# Patient Record
Sex: FEMALE | Race: WHITE | NOT HISPANIC OR LATINO | Employment: FULL TIME | URBAN - METROPOLITAN AREA
[De-identification: names, ages, dates, MRNs, and addresses within clinical notes are randomized per-mention and may not be internally consistent; named-entity substitution may affect disease eponyms.]

---

## 2022-07-31 ENCOUNTER — HOSPITAL ENCOUNTER (EMERGENCY)
Facility: HOSPITAL | Age: 57
Discharge: HOME | End: 2022-07-31
Attending: EMERGENCY MEDICINE | Admitting: EMERGENCY MEDICINE

## 2022-07-31 VITALS
HEIGHT: 64 IN | WEIGHT: 202 LBS | DIASTOLIC BLOOD PRESSURE: 82 MMHG | SYSTOLIC BLOOD PRESSURE: 130 MMHG | RESPIRATION RATE: 16 BRPM | TEMPERATURE: 96.6 F | OXYGEN SATURATION: 96 % | BODY MASS INDEX: 34.49 KG/M2 | HEART RATE: 72 BPM

## 2022-07-31 DIAGNOSIS — E11.65 UNCONTROLLED TYPE 2 DIABETES MELLITUS WITH HYPERGLYCEMIA: Primary | ICD-10-CM

## 2022-07-31 LAB
ALBUMIN SERPL-MCNC: 4.2 G/DL (ref 3.5–5.2)
ALBUMIN/GLOB SERPL: 1.3 G/DL
ALP SERPL-CCNC: 72 U/L (ref 39–117)
ALT SERPL W P-5'-P-CCNC: 89 U/L (ref 1–33)
ANION GAP SERPL CALCULATED.3IONS-SCNC: 14 MMOL/L (ref 5–15)
AST SERPL-CCNC: 50 U/L (ref 1–32)
B-OH-BUTYR SERPL-SCNC: 0.17 MMOL/L (ref 0.02–0.27)
BASOPHILS # BLD AUTO: 0.02 10*3/MM3 (ref 0–0.2)
BASOPHILS NFR BLD AUTO: 0.4 % (ref 0–1.5)
BILIRUB SERPL-MCNC: 0.4 MG/DL (ref 0–1.2)
BILIRUB UR QL STRIP: NEGATIVE
BUN SERPL-MCNC: 7 MG/DL (ref 6–20)
BUN/CREAT SERPL: 7.5 (ref 7–25)
CALCIUM SPEC-SCNC: 9.3 MG/DL (ref 8.6–10.5)
CHLORIDE SERPL-SCNC: 97 MMOL/L (ref 98–107)
CLARITY UR: CLEAR
CO2 SERPL-SCNC: 24 MMOL/L (ref 22–29)
COLOR UR: YELLOW
CREAT SERPL-MCNC: 0.93 MG/DL (ref 0.57–1)
DEPRECATED RDW RBC AUTO: 41.5 FL (ref 37–54)
EGFRCR SERPLBLD CKD-EPI 2021: 71.8 ML/MIN/1.73
EOSINOPHIL # BLD AUTO: 0.11 10*3/MM3 (ref 0–0.4)
EOSINOPHIL NFR BLD AUTO: 2.2 % (ref 0.3–6.2)
ERYTHROCYTE [DISTWIDTH] IN BLOOD BY AUTOMATED COUNT: 12.6 % (ref 12.3–15.4)
GLOBULIN UR ELPH-MCNC: 3.2 GM/DL
GLUCOSE BLDC GLUCOMTR-MCNC: 314 MG/DL (ref 70–130)
GLUCOSE BLDC GLUCOMTR-MCNC: 359 MG/DL (ref 70–130)
GLUCOSE BLDC GLUCOMTR-MCNC: >599 MG/DL (ref 70–130)
GLUCOSE SERPL-MCNC: 585 MG/DL (ref 65–99)
GLUCOSE UR STRIP-MCNC: ABNORMAL MG/DL
HCT VFR BLD AUTO: 42.3 % (ref 34–46.6)
HGB BLD-MCNC: 14.1 G/DL (ref 12–15.9)
HGB UR QL STRIP.AUTO: NEGATIVE
HOLD SPECIMEN: NORMAL
HOLD SPECIMEN: NORMAL
IMM GRANULOCYTES # BLD AUTO: 0.02 10*3/MM3 (ref 0–0.05)
IMM GRANULOCYTES NFR BLD AUTO: 0.4 % (ref 0–0.5)
KETONES UR QL STRIP: NEGATIVE
LEUKOCYTE ESTERASE UR QL STRIP.AUTO: NEGATIVE
LYMPHOCYTES # BLD AUTO: 1.26 10*3/MM3 (ref 0.7–3.1)
LYMPHOCYTES NFR BLD AUTO: 25.7 % (ref 19.6–45.3)
MCH RBC QN AUTO: 30.3 PG (ref 26.6–33)
MCHC RBC AUTO-ENTMCNC: 33.3 G/DL (ref 31.5–35.7)
MCV RBC AUTO: 90.8 FL (ref 79–97)
MONOCYTES # BLD AUTO: 0.4 10*3/MM3 (ref 0.1–0.9)
MONOCYTES NFR BLD AUTO: 8.1 % (ref 5–12)
NEUTROPHILS NFR BLD AUTO: 3.1 10*3/MM3 (ref 1.7–7)
NEUTROPHILS NFR BLD AUTO: 63.2 % (ref 42.7–76)
NITRITE UR QL STRIP: NEGATIVE
NRBC BLD AUTO-RTO: 0 /100 WBC (ref 0–0.2)
PH UR STRIP.AUTO: 5.5 [PH] (ref 5–8)
PLATELET # BLD AUTO: 219 10*3/MM3 (ref 140–450)
PMV BLD AUTO: 10.5 FL (ref 6–12)
POTASSIUM SERPL-SCNC: 3.9 MMOL/L (ref 3.5–5.2)
PROT SERPL-MCNC: 7.4 G/DL (ref 6–8.5)
PROT UR QL STRIP: NEGATIVE
RBC # BLD AUTO: 4.66 10*6/MM3 (ref 3.77–5.28)
SODIUM SERPL-SCNC: 135 MMOL/L (ref 136–145)
SP GR UR STRIP: >=1.03 (ref 1–1.03)
UROBILINOGEN UR QL STRIP: ABNORMAL
WBC NRBC COR # BLD: 4.91 10*3/MM3 (ref 3.4–10.8)
WHOLE BLOOD HOLD COAG: NORMAL
WHOLE BLOOD HOLD SPECIMEN: NORMAL

## 2022-07-31 PROCEDURE — 85025 COMPLETE CBC W/AUTO DIFF WBC: CPT

## 2022-07-31 PROCEDURE — 63710000001 INSULIN REGULAR HUMAN PER 5 UNITS: Performed by: NURSE PRACTITIONER

## 2022-07-31 PROCEDURE — 82962 GLUCOSE BLOOD TEST: CPT

## 2022-07-31 PROCEDURE — 99284 EMERGENCY DEPT VISIT MOD MDM: CPT

## 2022-07-31 PROCEDURE — 96374 THER/PROPH/DIAG INJ IV PUSH: CPT

## 2022-07-31 PROCEDURE — 82010 KETONE BODYS QUAN: CPT | Performed by: EMERGENCY MEDICINE

## 2022-07-31 PROCEDURE — 81003 URINALYSIS AUTO W/O SCOPE: CPT

## 2022-07-31 PROCEDURE — 80053 COMPREHEN METABOLIC PANEL: CPT

## 2022-07-31 RX ORDER — DULAGLUTIDE 1.5 MG/.5ML
0.5 INJECTION, SOLUTION SUBCUTANEOUS WEEKLY
Qty: 2 ML | Refills: 0 | Status: SHIPPED | OUTPATIENT
Start: 2022-07-31 | End: 2022-08-25 | Stop reason: SDUPTHER

## 2022-07-31 RX ORDER — INSULIN LISPRO 100 [IU]/ML
INJECTION, SUSPENSION SUBCUTANEOUS
Qty: 3 PEN | Refills: 0 | Status: SHIPPED | OUTPATIENT
Start: 2022-07-31 | End: 2022-08-19

## 2022-07-31 RX ORDER — SODIUM CHLORIDE 0.9 % (FLUSH) 0.9 %
10 SYRINGE (ML) INJECTION AS NEEDED
Status: DISCONTINUED | OUTPATIENT
Start: 2022-07-31 | End: 2022-07-31 | Stop reason: HOSPADM

## 2022-07-31 RX ADMIN — SODIUM CHLORIDE 1000 ML: 9 INJECTION, SOLUTION INTRAVENOUS at 14:35

## 2022-07-31 RX ADMIN — INSULIN HUMAN 10 UNITS: 100 INJECTION, SOLUTION PARENTERAL at 14:37

## 2022-08-01 ENCOUNTER — TELEPHONE (OUTPATIENT)
Dept: URGENT CARE | Facility: CLINIC | Age: 57
End: 2022-08-01

## 2022-08-01 NOTE — TELEPHONE ENCOUNTER
PATIENT HERE REQUESTING WORK NOTE. PATIENT WAS SEEN HERE YESTERDAY BY SAMARIA AND ADVISED TO GO TO HOSPITAL ER DUE TO HYPERGLYCEMIA. PATIENT NOTE STATES PATIENT WAS SEEN HERE AND RECOMMENDED TO GO TO ER FOR FURTHER EVALUATION. PATIENT NOTE DID  NOT SAY SHE WAS RELEASED FOR WORK, PATIENT INSTRUCTED SHE WOULD HAVE TO GET RETURN TO WORK NOTE FROM ER.

## 2022-08-04 ENCOUNTER — OFFICE VISIT (OUTPATIENT)
Dept: INTERNAL MEDICINE | Facility: CLINIC | Age: 57
End: 2022-08-04

## 2022-08-04 VITALS
TEMPERATURE: 97.1 F | OXYGEN SATURATION: 97 % | BODY MASS INDEX: 34.69 KG/M2 | SYSTOLIC BLOOD PRESSURE: 142 MMHG | DIASTOLIC BLOOD PRESSURE: 98 MMHG | HEART RATE: 80 BPM | WEIGHT: 203.2 LBS | HEIGHT: 64 IN

## 2022-08-04 DIAGNOSIS — E11.65 TYPE 2 DIABETES MELLITUS WITH HYPERGLYCEMIA, WITH LONG-TERM CURRENT USE OF INSULIN: ICD-10-CM

## 2022-08-04 DIAGNOSIS — K21.9 GASTROESOPHAGEAL REFLUX DISEASE, UNSPECIFIED WHETHER ESOPHAGITIS PRESENT: ICD-10-CM

## 2022-08-04 DIAGNOSIS — R53.82 CHRONIC FATIGUE: ICD-10-CM

## 2022-08-04 DIAGNOSIS — R03.0 ELEVATED BLOOD PRESSURE READING: ICD-10-CM
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
## 2022-08-04 DIAGNOSIS — Z79.4 TYPE 2 DIABETES MELLITUS WITH HYPERGLYCEMIA, WITH LONG-TERM CURRENT USE OF INSULIN: ICD-10-CM

## 2022-08-04 DIAGNOSIS — E03.9 HYPOTHYROIDISM, UNSPECIFIED TYPE: ICD-10-CM

## 2022-08-04 DIAGNOSIS — F41.8 MIXED ANXIETY AND DEPRESSIVE DISORDER: Primary | ICD-10-CM

## 2022-08-04 DIAGNOSIS — Z00.00 HEALTH CARE MAINTENANCE: ICD-10-CM

## 2022-08-04 PROCEDURE — 99214 OFFICE O/P EST MOD 30 MIN: CPT | Performed by: PHYSICIAN ASSISTANT

## 2022-08-04 RX ORDER — LEVOTHYROXINE SODIUM 0.2 MG/1
200 TABLET ORAL
Qty: 90 TABLET | Refills: 1 | Status: SHIPPED | OUTPATIENT
Start: 2022-08-04 | End: 2023-02-03

## 2022-08-04 RX ORDER — LEVOTHYROXINE SODIUM 0.05 MG/1
50 TABLET ORAL
Qty: 90 TABLET | Refills: 1 | Status: SHIPPED | OUTPATIENT
Start: 2022-08-04

## 2022-08-04 RX ORDER — BUPROPION HYDROCHLORIDE 300 MG/1
300 TABLET ORAL EVERY MORNING
Qty: 90 TABLET | Refills: 1 | Status: SHIPPED | OUTPATIENT
Start: 2022-08-04 | End: 2023-02-03

## 2022-08-04 RX ORDER — VENLAFAXINE HYDROCHLORIDE 75 MG/1
75 CAPSULE, EXTENDED RELEASE ORAL DAILY
Qty: 90 CAPSULE | Refills: 1 | Status: SHIPPED | OUTPATIENT
Start: 2022-08-04 | End: 2022-08-25

## 2022-08-04 RX ORDER — EPINEPHRINE 0.3 MG/.3ML
0.3 INJECTION SUBCUTANEOUS ONCE
Qty: 1 EACH | Refills: 1 | Status: SHIPPED | OUTPATIENT
Start: 2022-08-04 | End: 2022-08-04

## 2022-08-04 NOTE — PROGRESS NOTES
[facility]  [address]  [facility] [address]  Dept: [phone]  Dept Fax: [phone]  Loc: [phone]  Loc Fax: [phone]    Maureen Kohler  1965    Follow Up Office Visit Note    History of Present Illness:  Patient is a 57-year-old female in today to establish care for hypothyroidism, type 2 diabetes, chronic fatigue, GERD, mixed anxiety and depression, and history of hypertension.  Patient needing referral back to endocrinology.  On Synthroid 250 mcg daily.  Needing refill.    Patient recently in the emergency room for really high blood sugar reading.  Patient blood sugars down to about 400 now.  Was off of Trulicity for a while and got back on it recently.  Needing A1c rechecked.  Needing referral to endocrinology for this.  Denies any symptoms.  Overall feeling well with regard to this.    Continues to have chronic fatigue would like further evaluation for this.    On omeprazole 20 mg daily.  Taking as directed without problems or side effects.  Reports good symptomatic control.    On Wellbutrin 300 mg extended release daily and Effexor 75 mg extended release daily.  Taking as directed without any problems or side effects.  Reports good symptomatic control of anxiety and depression.    Has history of high blood pressure but not been on any medicine for a while.  Patient reports being normotensive.  When asked about blood pressure being high today patient states that she is nervous about being in the doctor's office.  Does not take her blood pressure at home.    Patient recently fell about a month ago.  Hit her head but did not seek immediate medical attention.  Head still hurting.  Patient struck the back of her head when she was getting out of the shower and slipped.      The following portions of the patient's history were reviewed and updated as appropriate: allergies, current medications, past family history, past medical history,  past social history, past surgical history, and problem list.    Medications:    Current Outpatient Medications:   •  buPROPion XL (WELLBUTRIN XL) 300 MG 24 hr tablet, Take 1 tablet by mouth Every Morning., Disp: 90 tablet, Rfl: 1  •  Calcium Carbonate 1500 (600 Ca) MG tablet, calcium carbonate 600 mg calcium (1,500 mg) tablet  Take 1 tablet every day by oral route., Disp: , Rfl:   •  Cholecalciferol 25 MCG (1000 UT) capsule, Vitamin D3 25 mcg (1,000 unit) capsule  Take 1 capsule every day by oral route., Disp: , Rfl:   •  desoximetasone (TOPICORT) 0.25 % cream, desoximetasone 0.25 % topical cream  APPLY TO RASH ON BACK BID X 2 WEEKS PRN FLARES, Disp: , Rfl:   •  Dulaglutide (Trulicity) 1.5 MG/0.5ML solution pen-injector, Inject 1.5 mg under the skin into the appropriate area as directed 1 (One) Time Per Week for 4 doses., Disp: 2 mL, Rfl: 0  •  EPINEPHrine (EPIPEN) 0.3 MG/0.3ML solution auto-injector injection, Inject 0.3 mL into the appropriate muscle as directed by prescriber 1 (One) Time for 1 dose., Disp: 1 each, Rfl: 1  •  Glucosamine Sulfate 500 MG tablet, glucosamine sulfate 500 mg tablet  Take 1 tablet every day by oral route., Disp: , Rfl:   •  Insulin Lispro Prot & Lispro (HumaLOG Mix 75/25 KwikPen) (75-25) 100 UNIT/ML suspension pen-injector pen, 10 units before breakfast 14 units before lunch 44 units at dinner, Disp: 3 pen, Rfl: 0  •  levothyroxine (SYNTHROID, LEVOTHROID) 200 MCG tablet, Take 1 tablet by mouth Every Morning., Disp: 90 tablet, Rfl: 1  •  levothyroxine (SYNTHROID, LEVOTHROID) 50 MCG tablet, Take 1 tablet by mouth Every Morning., Disp: 90 tablet, Rfl: 1  •  metFORMIN (GLUCOPHAGE) 1000 MG tablet, Take 1 tablet by mouth Every 12 (Twelve) Hours for 30 days., Disp: 60 tablet, Rfl: 0  •  omeprazole OTC (PriLOSEC OTC) 20 MG EC tablet, omeprazole 20 mg tablet,delayed release  Take 1 tablet every day by oral route., Disp: , Rfl:   •  polycarbophil 625 MG tablet tablet, Fiber (calcium  polycarbophil) 625 mg tablet  Take 1 tablet every day by oral route., Disp: , Rfl:   •  venlafaxine XR (EFFEXOR-XR) 75 MG 24 hr capsule, Take 1 capsule by mouth Daily., Disp: 90 capsule, Rfl: 1    Subjective  Allergies   Allergen Reactions   • Ace Inhibitors Other (See Comments) and Swelling   • Simvastatin Other (See Comments)        Past Medical History:   Diagnosis Date   • Anxiety    • Arthritis    • Cholelithiasis     taken out 23 years ago   • Depression     age 16   • Diabetes mellitus (HCC)    • Disease of thyroid gland    • GERD (gastroesophageal reflux disease)    • Headache     age 12   • Hyperlipidemia    • Hypertension    • Hypothyroidism     23 years ago   • Kidney stone     1 stone   • Low back pain     age 12   • Obesity    • Urinary tract infection        Past Surgical History:   Procedure Laterality Date   • CHOLECYSTECTOMY     • COLONOSCOPY     • HYMENECTOMY         Family History   Problem Relation Age of Onset   • Miscarriages / Stillbirths Mother         miscarriages   • Vision loss Mother    • Cancer Father            • COPD Father    • Hyperlipidemia Father    • Kidney disease Father    • Stroke Father    • Thyroid disease Father         Social History     Socioeconomic History   • Marital status:    Tobacco Use   • Smoking status: Never Smoker   • Smokeless tobacco: Never Used   Vaping Use   • Vaping Use: Never used   Substance and Sexual Activity   • Alcohol use: Never   • Drug use: Defer   • Sexual activity: Yes     Partners: Male     Birth control/protection: I.U.D.       Review of Systems   Constitutional: Positive for fatigue. Negative for activity change, chills, fever and unexpected weight change.   HENT: Negative for congestion, ear pain, postnasal drip, sinus pressure and sore throat.    Eyes: Negative for pain, discharge and redness.   Respiratory: Negative for cough, shortness of breath and wheezing.    Cardiovascular: Negative for chest pain, palpitations and leg  "swelling.   Gastrointestinal: Negative for diarrhea, nausea and vomiting.   Endocrine: Negative for cold intolerance and heat intolerance.   Genitourinary: Negative for decreased urine volume and dysuria.   Musculoskeletal: Negative for arthralgias and myalgias.   Skin: Negative for rash and wound.   Neurological: Positive for headaches. Negative for dizziness and light-headedness.   Hematological: Does not bruise/bleed easily.   Psychiatric/Behavioral: Negative for confusion, dysphoric mood and sleep disturbance. The patient is not nervous/anxious.          Objective  Vitals:    08/04/22 1516   BP: 142/98   BP Location: Left arm   Patient Position: Sitting   Pulse: 80   Temp: 97.1 °F (36.2 °C)   TempSrc: Temporal   SpO2: 97%   Weight: 92.2 kg (203 lb 3.2 oz)   Height: 162.6 cm (64.02\")   PainSc:   6     Body mass index is 34.86 kg/m².     Physical Exam  Physical Exam  Vitals and nursing note reviewed.   Constitutional:       General: She is not in acute distress.     Appearance: She is not ill-appearing.   HENT:      Head: Normocephalic.      Right Ear: Tympanic membrane, ear canal and external ear normal. There is no impacted cerumen.      Left Ear: Tympanic membrane, ear canal and external ear normal. There is no impacted cerumen.      Nose: No congestion or rhinorrhea.      Mouth/Throat:      Mouth: Mucous membranes are moist.      Pharynx: Oropharynx is clear. No oropharyngeal exudate or posterior oropharyngeal erythema.   Eyes:      General:         Right eye: No discharge.         Left eye: No discharge.      Extraocular Movements: Extraocular movements intact.      Conjunctiva/sclera: Conjunctivae normal.      Pupils: Pupils are equal, round, and reactive to light.   Cardiovascular:      Rate and Rhythm: Normal rate and regular rhythm.      Heart sounds: Normal heart sounds. No murmur heard.    No friction rub. No gallop.   Pulmonary:      Effort: Pulmonary effort is normal. No respiratory distress.      " Breath sounds: Normal breath sounds. No wheezing.   Abdominal:      General: Bowel sounds are normal. There is no distension.      Palpations: Abdomen is soft. There is no mass.      Tenderness: There is no abdominal tenderness.   Musculoskeletal:         General: No swelling. Normal range of motion.      Cervical back: Normal range of motion. No tenderness.      Right lower leg: No edema.      Left lower leg: No edema.   Lymphadenopathy:      Cervical: No cervical adenopathy.   Skin:     Findings: No bruising, erythema or rash.   Neurological:      Mental Status: She is oriented to person, place, and time.      Gait: Gait normal.   Psychiatric:         Mood and Affect: Mood normal.         Behavior: Behavior normal.         Thought Content: Thought content normal.         Judgment: Judgment normal.         Diagnostic Data  Procedures    Assessment  Diagnoses and all orders for this visit:    1. Mixed anxiety and depressive disorder (Primary)    2. Fall, subsequent encounter  -     CT Head Without Contrast; Future    3. Type 2 diabetes mellitus with hyperglycemia, with long-term current use of insulin (HCC)  -     Hemoglobin A1c; Future  -     Ambulatory Referral to Endocrinology  -     Microalbumin / Creatinine Urine Ratio - Urine, Clean Catch; Future    4. Hypothyroidism, unspecified type    5. Gastroesophageal reflux disease, unspecified whether esophagitis present    6. Elevated blood pressure reading    7. Chronic fatigue  -     Vitamin B12; Future  -     Folate; Future    8. Health care maintenance  -     CBC w AUTO Differential; Future  -     Comprehensive Metabolic Panel  -     Hemoglobin A1c; Future  -     Lipid Panel  -     TSH; Future  -     Hepatitis C Antibody    Other orders  -     buPROPion XL (WELLBUTRIN XL) 300 MG 24 hr tablet; Take 1 tablet by mouth Every Morning.  Dispense: 90 tablet; Refill: 1  -     EPINEPHrine (EPIPEN) 0.3 MG/0.3ML solution auto-injector injection; Inject 0.3 mL into the  appropriate muscle as directed by prescriber 1 (One) Time for 1 dose.  Dispense: 1 each; Refill: 1  -     levothyroxine (SYNTHROID, LEVOTHROID) 200 MCG tablet; Take 1 tablet by mouth Every Morning.  Dispense: 90 tablet; Refill: 1  -     levothyroxine (SYNTHROID, LEVOTHROID) 50 MCG tablet; Take 1 tablet by mouth Every Morning.  Dispense: 90 tablet; Refill: 1  -     venlafaxine XR (EFFEXOR-XR) 75 MG 24 hr capsule; Take 1 capsule by mouth Daily.  Dispense: 90 capsule; Refill: 1        Plan    1. Mixed anxiety and depressive disorder (Primary)- well-controlled on Wellbutrin and venlafaxine.  Keep same.  Refilled meds.  Continue to monitor.    2. Fall, subsequent encounter- still having headaches.  Obtain CT head.    3. Type 2 diabetes mellitus with hyperglycemia, with long-term current use of insulin (HCC)- vastly uncontrolled on metformin and Trulicity.  Repeat A1c.  Referred to endocrinology.    4. Hypothyroidism, unspecified type- on 250 mcg of Synthroid daily.  Repeat TSH.  Referred to endocrinology.    5. Gastroesophageal reflux disease, unspecified whether esophagitis present- well controlled on omeprazole 20 mg daily.  Keep same.  Continue to monitor.    6. Elevated blood pressure reading- Advised patient to take blood pressure readings at home 2-3 times daily.  Report readings back to clinic in 2 weeks.  Advised if blood pressure higher than 160/100 or lower than 100/60 to call the office immediately. If symptomatic, go to the ER. Patient verbalized understanding of all instructions given and complied.     7. Chronic fatigue- worse, obtain vitamin B12 and folate levels.    8. Health care maintenance- obtain fasting labs.      Return in about 2 weeks (around 8/18/2022) for Recheck BP.    Isaias Bourne PA-C  08/04/2022

## 2022-08-12 ENCOUNTER — HOSPITAL ENCOUNTER (OUTPATIENT)
Dept: CT IMAGING | Facility: HOSPITAL | Age: 57
Discharge: HOME | End: 2022-08-12
Admitting: PHYSICIAN ASSISTANT
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
## 2022-08-12 PROCEDURE — 70450 CT HEAD/BRAIN W/O DYE: CPT

## 2022-08-15 ENCOUNTER — TELEPHONE (OUTPATIENT)
Dept: INTERNAL MEDICINE | Facility: CLINIC | Age: 57
End: 2022-08-15

## 2022-08-15 NOTE — TELEPHONE ENCOUNTER
Attempted to reach patient about normal ct of head no answer couldn't leave a message. Sent patient a Geniuzzt message as well.

## 2022-08-18 ENCOUNTER — LAB (OUTPATIENT)
Dept: LAB | Facility: HOSPITAL | Age: 57
End: 2022-08-18

## 2022-08-18 DIAGNOSIS — Z00.00 HEALTH CARE MAINTENANCE: ICD-10-CM

## 2022-08-18 DIAGNOSIS — Z79.4 TYPE 2 DIABETES MELLITUS WITH HYPERGLYCEMIA, WITH LONG-TERM CURRENT USE OF INSULIN: ICD-10-CM

## 2022-08-18 DIAGNOSIS — R53.82 CHRONIC FATIGUE: ICD-10-CM

## 2022-08-18 DIAGNOSIS — E11.65 TYPE 2 DIABETES MELLITUS WITH HYPERGLYCEMIA, WITH LONG-TERM CURRENT USE OF INSULIN: ICD-10-CM

## 2022-08-18 LAB
ALBUMIN SERPL-MCNC: 4.2 G/DL (ref 3.5–5.2)
ALBUMIN UR-MCNC: 2.4 MG/DL
ALBUMIN/GLOB SERPL: 1.4 G/DL
ALP SERPL-CCNC: 60 U/L (ref 39–117)
ALT SERPL W P-5'-P-CCNC: 84 U/L (ref 1–33)
ANION GAP SERPL CALCULATED.3IONS-SCNC: 11 MMOL/L (ref 5–15)
AST SERPL-CCNC: 59 U/L (ref 1–32)
BASOPHILS # BLD AUTO: 0.01 10*3/MM3 (ref 0–0.2)
BASOPHILS NFR BLD AUTO: 0.2 % (ref 0–1.5)
BILIRUB SERPL-MCNC: 0.4 MG/DL (ref 0–1.2)
BUN SERPL-MCNC: 12 MG/DL (ref 6–20)
BUN/CREAT SERPL: 22.2 (ref 7–25)
CALCIUM SPEC-SCNC: 9.3 MG/DL (ref 8.6–10.5)
CHLORIDE SERPL-SCNC: 99 MMOL/L (ref 98–107)
CHOLEST SERPL-MCNC: 191 MG/DL (ref 0–200)
CO2 SERPL-SCNC: 26 MMOL/L (ref 22–29)
CREAT SERPL-MCNC: 0.54 MG/DL (ref 0.57–1)
CREAT UR-MCNC: 101.2 MG/DL
DEPRECATED RDW RBC AUTO: 40.7 FL (ref 37–54)
EGFRCR SERPLBLD CKD-EPI 2021: 107.5 ML/MIN/1.73
EOSINOPHIL # BLD AUTO: 0.1 10*3/MM3 (ref 0–0.4)
EOSINOPHIL NFR BLD AUTO: 2.4 % (ref 0.3–6.2)
ERYTHROCYTE [DISTWIDTH] IN BLOOD BY AUTOMATED COUNT: 12.6 % (ref 12.3–15.4)
FOLATE SERPL-MCNC: 5.5 NG/ML (ref 4.78–24.2)
GLOBULIN UR ELPH-MCNC: 3.1 GM/DL
GLUCOSE SERPL-MCNC: 146 MG/DL (ref 65–99)
HBA1C MFR BLD: 12.2 % (ref 4.8–5.6)
HCT VFR BLD AUTO: 40.7 % (ref 34–46.6)
HCV AB SER DONR QL: NORMAL
HDLC SERPL-MCNC: 45 MG/DL (ref 40–60)
HGB BLD-MCNC: 13.7 G/DL (ref 12–15.9)
IMM GRANULOCYTES # BLD AUTO: 0.01 10*3/MM3 (ref 0–0.05)
IMM GRANULOCYTES NFR BLD AUTO: 0.2 % (ref 0–0.5)
LDLC SERPL CALC-MCNC: 124 MG/DL (ref 0–100)
LDLC/HDLC SERPL: 2.7 {RATIO}
LYMPHOCYTES # BLD AUTO: 1.23 10*3/MM3 (ref 0.7–3.1)
LYMPHOCYTES NFR BLD AUTO: 29.1 % (ref 19.6–45.3)
MCH RBC QN AUTO: 30 PG (ref 26.6–33)
MCHC RBC AUTO-ENTMCNC: 33.7 G/DL (ref 31.5–35.7)
MCV RBC AUTO: 89.3 FL (ref 79–97)
MICROALBUMIN/CREAT UR: 23.7 MG/G
MONOCYTES # BLD AUTO: 0.41 10*3/MM3 (ref 0.1–0.9)
MONOCYTES NFR BLD AUTO: 9.7 % (ref 5–12)
NEUTROPHILS NFR BLD AUTO: 2.47 10*3/MM3 (ref 1.7–7)
NEUTROPHILS NFR BLD AUTO: 58.4 % (ref 42.7–76)
NRBC BLD AUTO-RTO: 0 /100 WBC (ref 0–0.2)
PLATELET # BLD AUTO: 249 10*3/MM3 (ref 140–450)
PMV BLD AUTO: 10.6 FL (ref 6–12)
POTASSIUM SERPL-SCNC: 3.7 MMOL/L (ref 3.5–5.2)
PROT SERPL-MCNC: 7.3 G/DL (ref 6–8.5)
RBC # BLD AUTO: 4.56 10*6/MM3 (ref 3.77–5.28)
SODIUM SERPL-SCNC: 136 MMOL/L (ref 136–145)
TRIGL SERPL-MCNC: 122 MG/DL (ref 0–150)
TSH SERPL DL<=0.05 MIU/L-ACNC: 4.86 UIU/ML (ref 0.27–4.2)
VIT B12 BLD-MCNC: 290 PG/ML (ref 211–946)
VLDLC SERPL-MCNC: 22 MG/DL (ref 5–40)
WBC NRBC COR # BLD: 4.23 10*3/MM3 (ref 3.4–10.8)

## 2022-08-18 PROCEDURE — 86803 HEPATITIS C AB TEST: CPT | Performed by: PHYSICIAN ASSISTANT

## 2022-08-18 PROCEDURE — 36415 COLL VENOUS BLD VENIPUNCTURE: CPT | Performed by: PHYSICIAN ASSISTANT

## 2022-08-18 PROCEDURE — 82607 VITAMIN B-12: CPT

## 2022-08-18 PROCEDURE — 82746 ASSAY OF FOLIC ACID SERUM: CPT

## 2022-08-18 PROCEDURE — 82043 UR ALBUMIN QUANTITATIVE: CPT

## 2022-08-18 PROCEDURE — 83036 HEMOGLOBIN GLYCOSYLATED A1C: CPT

## 2022-08-18 PROCEDURE — 80050 GENERAL HEALTH PANEL: CPT

## 2022-08-18 PROCEDURE — 82570 ASSAY OF URINE CREATININE: CPT

## 2022-08-18 PROCEDURE — 80061 LIPID PANEL: CPT | Performed by: PHYSICIAN ASSISTANT

## 2022-08-19 RX ORDER — INSULIN LISPRO 100 [IU]/ML
INJECTION, SUSPENSION SUBCUTANEOUS
Qty: 3 PEN | Refills: 0 | Status: SHIPPED | OUTPATIENT
Start: 2022-08-19 | End: 2022-08-25 | Stop reason: SDUPTHER

## 2022-08-25 ENCOUNTER — OFFICE VISIT (OUTPATIENT)
Dept: INTERNAL MEDICINE | Facility: CLINIC | Age: 57
End: 2022-08-25

## 2022-08-25 VITALS
DIASTOLIC BLOOD PRESSURE: 84 MMHG | TEMPERATURE: 96.9 F | BODY MASS INDEX: 34.79 KG/M2 | OXYGEN SATURATION: 98 % | WEIGHT: 203.8 LBS | SYSTOLIC BLOOD PRESSURE: 130 MMHG | HEART RATE: 78 BPM | HEIGHT: 64 IN

## 2022-08-25 DIAGNOSIS — E11.65 TYPE 2 DIABETES MELLITUS WITH HYPERGLYCEMIA, WITH LONG-TERM CURRENT USE OF INSULIN: Primary | ICD-10-CM

## 2022-08-25 DIAGNOSIS — F41.8 MIXED ANXIETY AND DEPRESSIVE DISORDER: ICD-10-CM

## 2022-08-25 DIAGNOSIS — Z79.4 TYPE 2 DIABETES MELLITUS WITH HYPERGLYCEMIA, WITH LONG-TERM CURRENT USE OF INSULIN: Primary | ICD-10-CM

## 2022-08-25 DIAGNOSIS — E03.9 HYPOTHYROIDISM, UNSPECIFIED TYPE: ICD-10-CM

## 2022-08-25 PROCEDURE — 99214 OFFICE O/P EST MOD 30 MIN: CPT | Performed by: PHYSICIAN ASSISTANT

## 2022-08-25 RX ORDER — DULAGLUTIDE 1.5 MG/.5ML
0.5 INJECTION, SOLUTION SUBCUTANEOUS WEEKLY
Qty: 2 ML | Refills: 0 | Status: SHIPPED | OUTPATIENT
Start: 2022-08-25 | End: 2022-09-16

## 2022-08-25 RX ORDER — VENLAFAXINE HYDROCHLORIDE 75 MG/1
75 CAPSULE, EXTENDED RELEASE ORAL 3 TIMES DAILY
Qty: 270 CAPSULE | Refills: 1 | Status: SHIPPED | OUTPATIENT
Start: 2022-08-25 | End: 2023-04-05

## 2022-08-25 RX ORDER — INSULIN LISPRO 100 [IU]/ML
INJECTION, SUSPENSION SUBCUTANEOUS
Qty: 3 PEN | Refills: 0 | Status: SHIPPED | OUTPATIENT
Start: 2022-08-25 | End: 2022-09-28

## 2022-08-25 NOTE — PROGRESS NOTES
[facility]  [address]  [facility] [address]  Dept: [phone]  Dept Fax: [phone]  Loc: [phone]  Loc Fax: [phone]    Maureen Kohler  1965    Follow Up Office Visit Note    History of Present Illness:  Patient is a 57-year-old female in today to follow-up for diabetes, hypothyroidism, mixed anxiety and depression, and elevated blood pressure.  Patient reports blood pressure running normal at home around 120/80.  Doing better today because she is not nervous.  Patient does have anxiety and depression on Wellbutrin 300 mg daily.  Also taking Effexor 75 mg tablets 2-1/2 tablets daily.  Has been on this dose for 3 years now.  Tolerating well.  Anxiety and depression well controlled.    Patient blood sugar coming down quite a bit on insulin 12 units before breakfast 16 units before lunch and 46 units at dinner, and Trulicity 1.5 mg weekly.  Taking as directed without any problems or side effects.    On Synthroid 250 mcg daily.  Taking as directed without problems or side effects.    Patient overall doing well and feeling well.    Diabetes  She has type 2 diabetes mellitus. No MedicAlert identification noted. The initial diagnosis of diabetes was made 15 years ago. Pertinent negatives for hypoglycemia include no confusion, dizziness, headaches, hunger, mood changes, nervousness/anxiousness, pallor, seizures, sleepiness, speech difficulty, sweats or tremors. Pertinent negatives for diabetes include no blurred vision, no chest pain, no fatigue, no foot paresthesias, no foot ulcerations, no polydipsia, no polyphagia, no polyuria, no visual change, no weakness and no weight loss. Hypoglycemia complications include hospitalization. Pertinent negatives for hypoglycemia complications include no blackouts, no nocturnal hypoglycemia, no required assistance and no required glucagon injection. Symptoms are stable. Pertinent negatives for diabetic  complications include no CVA, heart disease, impotence, nephropathy, peripheral neuropathy, PVD or retinopathy. Risk factors for coronary artery disease include family history, hypertension and obesity. Current diabetic treatment includes diet, insulin injections and oral agent (monotherapy). She is compliant with treatment most of the time. She is currently taking insulin pre-breakfast, pre-lunch and pre-dinner. Insulin injections are given by patient. Rotation sites for injection include the abdominal wall. Her weight is stable. She is following a generally healthy diet. Meal planning includes carbohydrate counting. She has not had a previous visit with a dietitian. She participates in exercise intermittently. She monitors blood glucose at home 1-2 x per day. She monitors urine at home <1 x per month. Blood glucose monitoring compliance is good. Her home blood glucose trend is decreasing steadily. Her breakfast blood glucose is taken between 6-7 am. Her breakfast blood glucose range is generally 180-200 mg/dl. Her lunch blood glucose is taken between 11-12 pm. Her lunch blood glucose range is generally 180-200 mg/dl. Her dinner blood glucose is taken between 4-5 pm. Her dinner blood glucose range is generally 180-200 mg/dl. Her highest blood glucose is >200 mg/dl. Her overall blood glucose range is >200 mg/dl. She does not see a podiatrist.Eye exam is current.       The following portions of the patient's history were reviewed and updated as appropriate: allergies, current medications, past family history, past medical history, past social history, past surgical history, and problem list.    Medications:    Current Outpatient Medications:   •  buPROPion XL (WELLBUTRIN XL) 300 MG 24 hr tablet, Take 1 tablet by mouth Every Morning., Disp: 90 tablet, Rfl: 1  •  Calcium Carbonate 1500 (600 Ca) MG tablet, calcium carbonate 600 mg calcium (1,500 mg) tablet  Take 1 tablet every day by oral route., Disp: , Rfl:   •   Cholecalciferol 25 MCG (1000 UT) capsule, Vitamin D3 25 mcg (1,000 unit) capsule  Take 1 capsule every day by oral route., Disp: , Rfl:   •  desoximetasone (TOPICORT) 0.25 % cream, desoximetasone 0.25 % topical cream  APPLY TO RASH ON BACK BID X 2 WEEKS PRN FLARES, Disp: , Rfl:   •  Dulaglutide (Trulicity) 1.5 MG/0.5ML solution pen-injector, Inject 1.5 mg under the skin into the appropriate area as directed 1 (One) Time Per Week for 4 doses., Disp: 2 mL, Rfl: 0  •  Glucosamine Sulfate 500 MG tablet, glucosamine sulfate 500 mg tablet  Take 1 tablet every day by oral route., Disp: , Rfl:   •  Insulin Lispro Prot & Lispro (HumaLOG Mix 75/25 KwikPen) (75-25) 100 UNIT/ML suspension pen-injector pen, 12 units before breakfast 16 units before lunch 46 units at dinner, Disp: 3 pen, Rfl: 0  •  levothyroxine (SYNTHROID, LEVOTHROID) 200 MCG tablet, Take 1 tablet by mouth Every Morning., Disp: 90 tablet, Rfl: 1  •  levothyroxine (SYNTHROID, LEVOTHROID) 50 MCG tablet, Take 1 tablet by mouth Every Morning., Disp: 90 tablet, Rfl: 1  •  metFORMIN (GLUCOPHAGE) 1000 MG tablet, Take 1 tablet by mouth Every 12 (Twelve) Hours for 30 days., Disp: 60 tablet, Rfl: 0  •  polycarbophil 625 MG tablet tablet, Fiber (calcium polycarbophil) 625 mg tablet  Take 1 tablet every day by oral route., Disp: , Rfl:   •  venlafaxine XR (EFFEXOR-XR) 75 MG 24 hr capsule, Take 1 capsule by mouth 3 (Three) Times a Day., Disp: 270 capsule, Rfl: 1    Subjective  Allergies   Allergen Reactions   • Ace Inhibitors Other (See Comments) and Swelling   • Simvastatin Other (See Comments)        Past Medical History:   Diagnosis Date   • Anxiety    • Arthritis    • Cholelithiasis     taken out 23 years ago   • Depression     age 16   • Diabetes mellitus (HCC)    • Disease of thyroid gland    • GERD (gastroesophageal reflux disease)    • Headache     age 12   • Hyperlipidemia    • Hypertension    • Hypothyroidism     23 years ago   • Kidney stone     1 stone   • Low  "back pain     age 12   • Obesity    • Urinary tract infection        Past Surgical History:   Procedure Laterality Date   • CHOLECYSTECTOMY     • COLONOSCOPY     • HYMENECTOMY         Family History   Problem Relation Age of Onset   • Miscarriages / Stillbirths Mother         miscarriages   • Vision loss Mother    • Cancer Father            • COPD Father    • Hyperlipidemia Father    • Kidney disease Father    • Stroke Father    • Thyroid disease Father         Social History     Socioeconomic History   • Marital status:    Tobacco Use   • Smoking status: Never Smoker   • Smokeless tobacco: Never Used   Vaping Use   • Vaping Use: Never used   Substance and Sexual Activity   • Alcohol use: Never   • Drug use: Defer   • Sexual activity: Yes     Partners: Male     Birth control/protection: I.U.D.       Review of Systems   Constitutional: Negative for fatigue and weight loss.   Eyes: Negative for blurred vision.   Cardiovascular: Negative for chest pain.   Endocrine: Negative for polydipsia, polyphagia and polyuria.   Genitourinary: Negative for impotence.   Skin: Negative for pallor.   Neurological: Negative for dizziness, tremors, seizures, speech difficulty, weakness and headaches.   Psychiatric/Behavioral: Negative for confusion. The patient is not nervous/anxious.          Objective  Vitals:    22 0822   BP: 130/84   BP Location: Right arm   Patient Position: Sitting   Pulse: 78   Temp: 96.9 °F (36.1 °C)   TempSrc: Temporal   SpO2: 98%   Weight: 92.4 kg (203 lb 12.8 oz)   Height: 162.6 cm (64.02\")     Body mass index is 34.96 kg/m².     Physical Exam  Physical Exam  Vitals and nursing note reviewed.   Constitutional:       General: She is not in acute distress.     Appearance: She is not ill-appearing.   HENT:      Head: Normocephalic.      Right Ear: Tympanic membrane, ear canal and external ear normal. There is no impacted cerumen.      Left Ear: Tympanic membrane, ear canal and external ear " normal. There is no impacted cerumen.      Nose: No congestion or rhinorrhea.      Mouth/Throat:      Mouth: Mucous membranes are moist.      Pharynx: Oropharynx is clear. No oropharyngeal exudate or posterior oropharyngeal erythema.   Eyes:      General:         Right eye: No discharge.         Left eye: No discharge.      Extraocular Movements: Extraocular movements intact.      Conjunctiva/sclera: Conjunctivae normal.      Pupils: Pupils are equal, round, and reactive to light.   Cardiovascular:      Rate and Rhythm: Normal rate and regular rhythm.      Heart sounds: Normal heart sounds. No murmur heard.    No friction rub. No gallop.   Pulmonary:      Effort: Pulmonary effort is normal. No respiratory distress.      Breath sounds: Normal breath sounds. No wheezing.   Abdominal:      General: Bowel sounds are normal. There is no distension.      Palpations: Abdomen is soft. There is no mass.      Tenderness: There is no abdominal tenderness.   Musculoskeletal:         General: No swelling. Normal range of motion.      Cervical back: Normal range of motion. No tenderness.      Right lower leg: No edema.      Left lower leg: No edema.   Lymphadenopathy:      Cervical: No cervical adenopathy.   Skin:     Findings: No bruising, erythema or rash.   Neurological:      Mental Status: She is oriented to person, place, and time.      Gait: Gait normal.   Psychiatric:         Mood and Affect: Mood normal.         Behavior: Behavior normal.         Thought Content: Thought content normal.         Judgment: Judgment normal.         Diagnostic Data  Procedures    Assessment  Diagnoses and all orders for this visit:    1. Type 2 diabetes mellitus with hyperglycemia, with long-term current use of insulin (HCC) (Primary)    2. Hypothyroidism, unspecified type    3. Mixed anxiety and depressive disorder    Other orders  -     venlafaxine XR (EFFEXOR-XR) 75 MG 24 hr capsule; Take 1 capsule by mouth 3 (Three) Times a Day.   Dispense: 270 capsule; Refill: 1  -     Insulin Lispro Prot & Lispro (HumaLOG Mix 75/25 KwikPen) (75-25) 100 UNIT/ML suspension pen-injector pen; 12 units before breakfast 16 units before lunch 46 units at dinner  Dispense: 3 pen; Refill: 0  -     Dulaglutide (Trulicity) 1.5 MG/0.5ML solution pen-injector; Inject 1.5 mg under the skin into the appropriate area as directed 1 (One) Time Per Week for 4 doses.  Dispense: 2 mL; Refill: 0        Plan    1. Type 2 diabetes mellitus with hyperglycemia, with long-term current use of insulin (HCC) (Primary)- doing much better on Trulicity and insulin. Keep same.  Continue to monitor.    2. Hypothyroidism, unspecified type- doing better on Synthroid 250 mcg daily.  Keep same.  Continue to monitor.  Recheck levels at follow-up.    3. Mixed anxiety and depressive disorder- well controlled on Wellbutrin 300 mg daily and Effexor daily.  Keep same.  Continue to monitor.      Return in about 1 month (around 9/25/2022) for Recheck.    Isaias Bourne PA-C  08/25/2022  Answers for HPI/ROS submitted by the patient on 8/23/2022  What is the primary reason for your visit?: Diabetes

## 2022-08-26 ENCOUNTER — PATIENT ROUNDING (BHMG ONLY) (OUTPATIENT)
Dept: INTERNAL MEDICINE | Facility: CLINIC | Age: 57
End: 2022-08-26

## 2022-08-26 NOTE — PROGRESS NOTES
A  my chart message has been sent to the patient for patient rounding with [facility].

## 2022-09-28 ENCOUNTER — OFFICE VISIT (OUTPATIENT)
Dept: INTERNAL MEDICINE | Facility: CLINIC | Age: 57
End: 2022-09-28

## 2022-09-28 VITALS
OXYGEN SATURATION: 98 % | HEART RATE: 84 BPM | BODY MASS INDEX: 34.69 KG/M2 | SYSTOLIC BLOOD PRESSURE: 124 MMHG | TEMPERATURE: 98.2 F | DIASTOLIC BLOOD PRESSURE: 82 MMHG | WEIGHT: 203.2 LBS | HEIGHT: 64 IN

## 2022-09-28 DIAGNOSIS — Z79.4 TYPE 2 DIABETES MELLITUS WITH HYPERGLYCEMIA, WITH LONG-TERM CURRENT USE OF INSULIN: Primary | ICD-10-CM

## 2022-09-28 DIAGNOSIS — M54.41 CHRONIC BILATERAL LOW BACK PAIN WITH BILATERAL SCIATICA: ICD-10-CM

## 2022-09-28 DIAGNOSIS — G89.29 CHRONIC BILATERAL LOW BACK PAIN WITH BILATERAL SCIATICA: ICD-10-CM

## 2022-09-28 DIAGNOSIS — Z23 NEED FOR INFLUENZA VACCINATION: ICD-10-CM

## 2022-09-28 DIAGNOSIS — F41.8 MIXED ANXIETY AND DEPRESSIVE DISORDER: ICD-10-CM

## 2022-09-28 DIAGNOSIS — M54.42 CHRONIC BILATERAL LOW BACK PAIN WITH BILATERAL SCIATICA: ICD-10-CM

## 2022-09-28 DIAGNOSIS — E11.65 TYPE 2 DIABETES MELLITUS WITH HYPERGLYCEMIA, WITH LONG-TERM CURRENT USE OF INSULIN: Primary | ICD-10-CM

## 2022-09-28 PROCEDURE — 90686 IIV4 VACC NO PRSV 0.5 ML IM: CPT | Performed by: PHYSICIAN ASSISTANT

## 2022-09-28 PROCEDURE — 99214 OFFICE O/P EST MOD 30 MIN: CPT | Performed by: PHYSICIAN ASSISTANT

## 2022-09-28 PROCEDURE — 90471 IMMUNIZATION ADMIN: CPT | Performed by: PHYSICIAN ASSISTANT

## 2022-09-28 RX ORDER — LIDOCAINE 50 MG/G
1 PATCH TOPICAL EVERY 24 HOURS
Qty: 60 EACH | Refills: 1 | Status: SHIPPED | OUTPATIENT
Start: 2022-09-28

## 2022-09-28 RX ORDER — INSULIN LISPRO 100 [IU]/ML
INJECTION, SUSPENSION SUBCUTANEOUS
Qty: 15 ML | Refills: 3 | Status: SHIPPED | OUTPATIENT
Start: 2022-09-28 | End: 2022-12-01

## 2022-09-28 NOTE — PROGRESS NOTES
[facility]  [address]  [facility] [address]  Dept: [phone]  Dept Fax: [phone]  Loc: [phone]  Loc Fax: [phone]    Maureen Kohler  1965    Follow Up Office Visit Note    History of Present Illness:  Patient is a 57-year-old female in today to follow-up for diabetes, anxiety, and chronic low back pain.  On Effexor 75 mg extended release daily and Wellbutrin 300 mg extended release daily for anxiety.  Patient taking as directed without any problems or side effects reports excellent symptomatic control.    On metformin 1000 mg twice daily and insulin Humalog daily.  Taking as directed.  Blood sugar still in the low 200s.  Denies any symptoms.  Patient reports vision doing much better.    Patient continues to struggle with chronic low back pain.  Is only taking aspirin for this.  Sees chiropractor, however he is out of town this week.    Diabetes  She has type 2 diabetes mellitus. No MedicAlert identification noted. The initial diagnosis of diabetes was made 15 years ago. Pertinent negatives for hypoglycemia include no confusion, dizziness, headaches, hunger, mood changes, nervousness/anxiousness, pallor, seizures, sleepiness, speech difficulty, sweats or tremors. Associated symptoms include visual change. Pertinent negatives for diabetes include no blurred vision, no chest pain, no fatigue, no foot paresthesias, no foot ulcerations, no polydipsia, no polyphagia, no polyuria, no weakness and no weight loss. Pertinent negatives for hypoglycemia complications include no blackouts, no hospitalization, no nocturnal hypoglycemia, no required assistance and no required glucagon injection. Symptoms are improving. Pertinent negatives for diabetic complications include no CVA, heart disease, impotence, nephropathy, peripheral neuropathy, PVD or retinopathy. Risk factors for coronary artery disease include family history and obesity. Current  diabetic treatment includes diet, insulin injections and oral agent (monotherapy). She is compliant with treatment most of the time. She is currently taking insulin pre-breakfast, pre-lunch and pre-dinner. Insulin injections are given by patient. Rotation sites for injection include the abdominal wall. Her weight is stable. She is following a generally healthy diet. Meal planning includes avoidance of concentrated sweets. She has not had a previous visit with a dietitian. She participates in exercise weekly. She monitors blood glucose at home 1-2 x per day. She monitors urine at home <1 x per month. Blood glucose monitoring compliance is good. Her home blood glucose trend is decreasing steadily. Her breakfast blood glucose is taken between 6-7 am. Her breakfast blood glucose range is generally 180-200 mg/dl. Her lunch blood glucose is taken between 10-11 am. Her lunch blood glucose range is generally 140-180 mg/dl. Her dinner blood glucose is taken between 5-6 pm. Her dinner blood glucose range is generally 180-200 mg/dl. Her highest blood glucose is 180-200 mg/dl. Her overall blood glucose range is 180-200 mg/dl. She does not see a podiatrist.Eye exam is current.       The following portions of the patient's history were reviewed and updated as appropriate: allergies, current medications, past family history, past medical history, past social history, past surgical history, and problem list.    Medications:    Current Outpatient Medications:   •  buPROPion XL (WELLBUTRIN XL) 300 MG 24 hr tablet, Take 1 tablet by mouth Every Morning., Disp: 90 tablet, Rfl: 1  •  Calcium Carbonate 1500 (600 Ca) MG tablet, calcium carbonate 600 mg calcium (1,500 mg) tablet  Take 1 tablet every day by oral route., Disp: , Rfl:   •  Cholecalciferol 25 MCG (1000 UT) capsule, Vitamin D3 25 mcg (1,000 unit) capsule  Take 1 capsule every day by oral route., Disp: , Rfl:   •  desoximetasone (TOPICORT) 0.25 % cream, desoximetasone 0.25 %  topical cream  APPLY TO RASH ON BACK BID X 2 WEEKS PRN FLARES, Disp: , Rfl:   •  Glucosamine Sulfate 500 MG tablet, glucosamine sulfate 500 mg tablet  Take 1 tablet every day by oral route., Disp: , Rfl:   •  Insulin Lispro Prot & Lispro (HumaLOG Mix 75/25 KwikPen) (75-25) 100 UNIT/ML suspension pen-injector pen, 14 units before breakfast 18 units before lunch 48 units at dinner, Disp: 15 mL, Rfl: 3  •  levothyroxine (SYNTHROID, LEVOTHROID) 200 MCG tablet, Take 1 tablet by mouth Every Morning., Disp: 90 tablet, Rfl: 1  •  levothyroxine (SYNTHROID, LEVOTHROID) 50 MCG tablet, Take 1 tablet by mouth Every Morning., Disp: 90 tablet, Rfl: 1  •  polycarbophil 625 MG tablet tablet, Fiber (calcium polycarbophil) 625 mg tablet  Take 1 tablet every day by oral route., Disp: , Rfl:   •  venlafaxine XR (EFFEXOR-XR) 75 MG 24 hr capsule, Take 1 capsule by mouth 3 (Three) Times a Day., Disp: 270 capsule, Rfl: 1  •  Diclofenac Sodium (VOLTAREN) 1 % gel gel, Apply 1 gram topically to indicated area 4 times daily as needed for mild to moderate pain., Disp: 100 g, Rfl: 2  •  lidocaine (LIDODERM) 5 %, Place 1 patch on the skin as directed by provider Daily. Remove & Discard patch within 12 hours or as directed by MD, Disp: 60 each, Rfl: 1  •  metFORMIN (GLUCOPHAGE) 1000 MG tablet, Take 1 tablet by mouth Every 12 (Twelve) Hours for 30 days., Disp: 60 tablet, Rfl: 0    Subjective  Allergies   Allergen Reactions   • Ace Inhibitors Other (See Comments) and Swelling   • Simvastatin Other (See Comments)        Past Medical History:   Diagnosis Date   • Anxiety    • Arthritis    • Cholelithiasis     taken out 23 years ago   • Depression     age 16   • Diabetes mellitus (HCC)    • Disease of thyroid gland    • GERD (gastroesophageal reflux disease)    • Headache     age 12   • Hyperlipidemia    • Hypertension    • Hypothyroidism     23 years ago   • Kidney stone     1 stone   • Low back pain     age 12   • Obesity    • Urinary tract infection   "      Past Surgical History:   Procedure Laterality Date   • CHOLECYSTECTOMY     • COLONOSCOPY     • HYMENECTOMY         Family History   Problem Relation Age of Onset   • Miscarriages / Stillbirths Mother         miscarriages   • Vision loss Mother    • Cancer Father            • COPD Father    • Hyperlipidemia Father    • Kidney disease Father    • Stroke Father    • Thyroid disease Father         Social History     Socioeconomic History   • Marital status:    Tobacco Use   • Smoking status: Never Smoker   • Smokeless tobacco: Never Used   Vaping Use   • Vaping Use: Never used   Substance and Sexual Activity   • Alcohol use: Never   • Drug use: Defer   • Sexual activity: Yes     Partners: Male     Birth control/protection: I.U.D.       Review of Systems   Constitutional: Negative for fatigue and weight loss.   Eyes: Negative for blurred vision.   Cardiovascular: Negative for chest pain.   Endocrine: Negative for polydipsia, polyphagia and polyuria.   Genitourinary: Negative for impotence.   Skin: Negative for pallor.   Neurological: Negative for dizziness, tremors, seizures, speech difficulty, weakness and headaches.   Psychiatric/Behavioral: Negative for confusion. The patient is not nervous/anxious.          Objective  Vitals:    22 1506   BP: 124/82   BP Location: Right arm   Patient Position: Sitting   Pulse: 84   Temp: 98.2 °F (36.8 °C)   TempSrc: Temporal   SpO2: 98%   Weight: 92.2 kg (203 lb 3.2 oz)   Height: 162.6 cm (64.02\")     Body mass index is 34.86 kg/m².     Physical Exam  Physical Exam  Vitals and nursing note reviewed.   Constitutional:       General: She is not in acute distress.     Appearance: She is not ill-appearing.   HENT:      Head: Normocephalic.      Right Ear: Tympanic membrane, ear canal and external ear normal. There is no impacted cerumen.      Left Ear: Tympanic membrane, ear canal and external ear normal. There is no impacted cerumen.      Nose: No congestion " or rhinorrhea.      Mouth/Throat:      Mouth: Mucous membranes are moist.      Pharynx: Oropharynx is clear. No oropharyngeal exudate or posterior oropharyngeal erythema.   Eyes:      General:         Right eye: No discharge.         Left eye: No discharge.      Extraocular Movements: Extraocular movements intact.      Conjunctiva/sclera: Conjunctivae normal.      Pupils: Pupils are equal, round, and reactive to light.   Cardiovascular:      Rate and Rhythm: Normal rate and regular rhythm.      Heart sounds: Normal heart sounds. No murmur heard.    No friction rub. No gallop.   Pulmonary:      Effort: Pulmonary effort is normal. No respiratory distress.      Breath sounds: Normal breath sounds. No wheezing.   Abdominal:      General: Bowel sounds are normal. There is no distension.      Palpations: Abdomen is soft. There is no mass.      Tenderness: There is no abdominal tenderness.   Musculoskeletal:         General: No swelling. Normal range of motion.      Cervical back: Normal range of motion. No tenderness.      Right lower leg: No edema.      Left lower leg: No edema.   Lymphadenopathy:      Cervical: No cervical adenopathy.   Skin:     Findings: No bruising, erythema or rash.   Neurological:      Mental Status: She is oriented to person, place, and time.      Gait: Gait normal.   Psychiatric:         Mood and Affect: Mood normal.         Behavior: Behavior normal.         Thought Content: Thought content normal.         Judgment: Judgment normal.         Diagnostic Data  Procedures    Assessment  Diagnoses and all orders for this visit:    1. Type 2 diabetes mellitus with hyperglycemia, with long-term current use of insulin (HCC) (Primary)    2. Mixed anxiety and depressive disorder    3. Chronic bilateral low back pain with bilateral sciatica    4. Need for influenza vaccination  -     FluLaval/Fluzone >6 mos ([id number])    Other orders  -     Diclofenac Sodium (VOLTAREN) 1 % gel gel; Apply 1 gram topically  to indicated area 4 times daily as needed for mild to moderate pain.  Dispense: 100 g; Refill: 2  -     lidocaine (LIDODERM) 5 %; Place 1 patch on the skin as directed by provider Daily. Remove & Discard patch within 12 hours or as directed by MD  Dispense: 60 each; Refill: 1  -     Insulin Lispro Prot & Lispro (HumaLOG Mix 75/25 KwikPen) (75-25) 100 UNIT/ML suspension pen-injector pen; 14 units before breakfast 18 units before lunch 48 units at dinner  Dispense: 15 mL; Refill: 3        Plan    1. Type 2 diabetes mellitus with hyperglycemia, with long-term current use of insulin (HCC) (Primary)-better but still uncontrolled on Humalog, increase to 14 units before breakfast, 18 units before lunch and 48 units at dinner.  Keep metformin same.  Continue to monitor.  Repeat A1c at follow-up.    2. Mixed anxiety and depressive disorder- well-controlled on Effexor and Wellbutrin.  Keep same.  Continue to monitor.    3. Chronic bilateral low back pain with bilateral sciatica- worse, advised on Tylenol arthritis over-the-counter.  Recent labs normal.  Sent in Voltaren gel and lidocaine patches as directed.    4. Need for influenza vaccination- administered flu vaccine in office today, patient tolerated well.      Return in about 2 months (around 11/28/2022) for Recheck.    Isaias Bourne PA-C  09/28/2022  Answers for HPI/ROS submitted by the patient on 9/21/2022  What is the primary reason for your visit?: Diabetes

## 2022-10-03 ENCOUNTER — PRIOR AUTHORIZATION (OUTPATIENT)
Dept: INTERNAL MEDICINE | Facility: CLINIC | Age: 57
End: 2022-10-03

## 2022-10-18 ENCOUNTER — PRIOR AUTHORIZATION (OUTPATIENT)
Dept: INTERNAL MEDICINE | Facility: CLINIC | Age: 57
End: 2022-10-18

## 2022-12-01 ENCOUNTER — OFFICE VISIT (OUTPATIENT)
Dept: INTERNAL MEDICINE | Facility: CLINIC | Age: 57
End: 2022-12-01

## 2022-12-01 VITALS
DIASTOLIC BLOOD PRESSURE: 78 MMHG | HEIGHT: 64 IN | HEART RATE: 70 BPM | TEMPERATURE: 96.9 F | OXYGEN SATURATION: 99 % | BODY MASS INDEX: 36.4 KG/M2 | WEIGHT: 213.2 LBS | SYSTOLIC BLOOD PRESSURE: 112 MMHG | RESPIRATION RATE: 18 BRPM

## 2022-12-01 DIAGNOSIS — I49.3 FREQUENT PVCS: ICD-10-CM

## 2022-12-01 DIAGNOSIS — Z00.00 HEALTH CARE MAINTENANCE: ICD-10-CM

## 2022-12-01 DIAGNOSIS — K21.9 GASTROESOPHAGEAL REFLUX DISEASE, UNSPECIFIED WHETHER ESOPHAGITIS PRESENT: ICD-10-CM

## 2022-12-01 DIAGNOSIS — E11.65 TYPE 2 DIABETES MELLITUS WITH HYPERGLYCEMIA, WITH LONG-TERM CURRENT USE OF INSULIN: Primary | ICD-10-CM

## 2022-12-01 DIAGNOSIS — Z79.4 TYPE 2 DIABETES MELLITUS WITH HYPERGLYCEMIA, WITH LONG-TERM CURRENT USE OF INSULIN: Primary | ICD-10-CM

## 2022-12-01 DIAGNOSIS — E03.9 HYPOTHYROIDISM, UNSPECIFIED TYPE: ICD-10-CM

## 2022-12-01 DIAGNOSIS — F41.8 MIXED ANXIETY AND DEPRESSIVE DISORDER: ICD-10-CM

## 2022-12-01 LAB
EXPIRATION DATE: NORMAL
HBA1C MFR BLD: 7.8 %
Lab: NORMAL

## 2022-12-01 PROCEDURE — 83036 HEMOGLOBIN GLYCOSYLATED A1C: CPT | Performed by: PHYSICIAN ASSISTANT

## 2022-12-01 PROCEDURE — 99214 OFFICE O/P EST MOD 30 MIN: CPT | Performed by: PHYSICIAN ASSISTANT

## 2022-12-01 RX ORDER — INSULIN LISPRO 100 [IU]/ML
INJECTION, SUSPENSION SUBCUTANEOUS
Qty: 15 ML | Refills: 3 | Status: SHIPPED | OUTPATIENT
Start: 2022-12-01 | End: 2022-12-07 | Stop reason: SDUPTHER

## 2022-12-01 RX ORDER — BLOOD SUGAR DIAGNOSTIC
1 STRIP MISCELLANEOUS 3 TIMES DAILY
Qty: 300 EACH | Refills: 1 | Status: SHIPPED | OUTPATIENT
Start: 2022-12-01

## 2022-12-01 NOTE — PROGRESS NOTES
[facility]  [address]  [facility] [address]  Dept: [phone]  Dept Fax: [phone]  Loc: [phone]  Loc Fax: [phone]    Maureen Kohler  1965    Follow Up Office Visit Note    History of Present Illness:  Patient is a 57-year-old female in today to follow-up for type 2 diabetes, anxiety and depression, hypothyroidism, and GERD.  Patient noted to have irregular heart rate on exam.  Denies a history of this.  Denies any cardiac symptoms such as chest pain, shortness of breath, lightheadedness, syncope, or near syncopal episodes.    Patient blood sugars doing much better.  A1c in office today down from 12.2 to 7.8%.  On Humalog daily.  Taking as directed without any problems or side effects.  Also on metformin 1000 mg daily.  Taking as directed without any problems or side effects.    Patient on Synthroid 250 mcg daily.  Taking as directed without any problems or side effects.  Needing TSH rechecked.    Patient on Prilosec 20 mg daily.  Good symptomatic control of acid reflux.    Diabetes  She has type 2 diabetes mellitus. MedicAlert identification noted. The initial diagnosis of diabetes was made 20 Years ago. Pertinent negatives for hypoglycemia include no confusion, dizziness, headaches or nervousness/anxiousness. Pertinent negatives for diabetes include no chest pain and no fatigue. Symptoms are improving. Risk factors for coronary artery disease include obesity. Current diabetic treatment includes diet, insulin injections and oral agent (monotherapy). She is compliant with treatment most of the time. She is currently taking insulin pre-breakfast, pre-lunch and pre-dinner. Insulin injections are given by patient. Rotation sites for injection include the abdominal wall. Her weight is decreasing steadily. She is following a diabetic, generally healthy and low fat/cholesterol diet. Meal planning includes avoidance of concentrated sweets and  carbohydrate counting. She has not had a previous visit with a dietitian. She participates in exercise weekly. She monitors blood glucose at home 3-4 x per week. Blood glucose monitoring compliance is fair. Her home blood glucose trend is decreasing steadily. Her breakfast blood glucose is taken between 5-6 am. Her breakfast blood glucose range is generally 140-180 mg/dl. Her lunch blood glucose is taken between 11-12 pm. Her lunch blood glucose range is generally 140-180 mg/dl. Her dinner blood glucose is taken between 4-5 pm. Her dinner blood glucose range is generally >200 mg/dl. Her highest blood glucose is 180-200 mg/dl. Her overall blood glucose range is 140-180 mg/dl. She does not see a podiatrist.Eye exam is current.       The following portions of the patient's history were reviewed and updated as appropriate: allergies, current medications, past family history, past medical history, past social history, past surgical history, and problem list.    Medications:    Current Outpatient Medications:   •  buPROPion XL (WELLBUTRIN XL) 300 MG 24 hr tablet, Take 1 tablet by mouth Every Morning., Disp: 90 tablet, Rfl: 1  •  Calcium Carbonate 1500 (600 Ca) MG tablet, calcium carbonate 600 mg calcium (1,500 mg) tablet  Take 1 tablet every day by oral route., Disp: , Rfl:   •  Cholecalciferol 25 MCG (1000 UT) capsule, Vitamin D3 25 mcg (1,000 unit) capsule  Take 1 capsule every day by oral route., Disp: , Rfl:   •  desoximetasone (TOPICORT) 0.25 % cream, desoximetasone 0.25 % topical cream  APPLY TO RASH ON BACK BID X 2 WEEKS PRN FLARES, Disp: , Rfl:   •  Diclofenac Sodium (VOLTAREN) 1 % gel gel, Apply 1 gram topically to indicated area 4 times daily as needed for mild to moderate pain., Disp: 100 g, Rfl: 2  •  Glucosamine Sulfate 500 MG tablet, glucosamine sulfate 500 mg tablet  Take 1 tablet every day by oral route., Disp: , Rfl:   •  Insulin Lispro Prot & Lispro (HumaLOG Mix 75/25 KwikPen) (75-25) 100 UNIT/ML  suspension pen-injector pen, 14 units before breakfast 18 units before lunch 48 units at dinner, Disp: 15 mL, Rfl: 3  •  levothyroxine (SYNTHROID, LEVOTHROID) 200 MCG tablet, Take 1 tablet by mouth Every Morning., Disp: 90 tablet, Rfl: 1  •  levothyroxine (SYNTHROID, LEVOTHROID) 50 MCG tablet, Take 1 tablet by mouth Every Morning., Disp: 90 tablet, Rfl: 1  •  lidocaine (LIDODERM) 5 %, Place 1 patch on the skin as directed by provider Daily. Remove & Discard patch within 12 hours or as directed by MD, Disp: 60 each, Rfl: 1  •  polycarbophil 625 MG tablet tablet, Fiber (calcium polycarbophil) 625 mg tablet  Take 1 tablet every day by oral route., Disp: , Rfl:   •  venlafaxine XR (EFFEXOR-XR) 75 MG 24 hr capsule, Take 1 capsule by mouth 3 (Three) Times a Day., Disp: 270 capsule, Rfl: 1  •  metFORMIN (GLUCOPHAGE) 1000 MG tablet, Take 1 tablet by mouth Every 12 (Twelve) Hours for 30 days., Disp: 60 tablet, Rfl: 0    Subjective  Allergies   Allergen Reactions   • Ace Inhibitors Other (See Comments) and Swelling   • Simvastatin Other (See Comments)        Past Medical History:   Diagnosis Date   • Anxiety    • Arthritis    • Cholelithiasis     taken out 23 years ago   • Depression     age 16   • Diabetes mellitus (HCC)    • Disease of thyroid gland    • GERD (gastroesophageal reflux disease)    • Headache     age 12   • Hyperlipidemia    • Hypertension    • Hypothyroidism     23 years ago   • Kidney stone     1 stone   • Low back pain     age 12   • Obesity    • Urinary tract infection        Past Surgical History:   Procedure Laterality Date   • CHOLECYSTECTOMY     • COLONOSCOPY     • HYMENECTOMY         Family History   Problem Relation Age of Onset   • Miscarriages / Stillbirths Mother         miscarriages   • Vision loss Mother    • Cancer Father            • COPD Father    • Hyperlipidemia Father    • Kidney disease Father    • Stroke Father    • Thyroid disease Father         Social History     Socioeconomic  "History   • Marital status:    Tobacco Use   • Smoking status: Never   • Smokeless tobacco: Never   Vaping Use   • Vaping Use: Never used   Substance and Sexual Activity   • Alcohol use: Never   • Drug use: Defer   • Sexual activity: Yes     Partners: Male     Birth control/protection: I.U.D.       Review of Systems   Constitutional: Negative for activity change, chills, fatigue, fever and unexpected weight change.   HENT: Negative for congestion, ear pain, postnasal drip, sinus pressure and sore throat.    Eyes: Negative for pain, discharge and redness.   Respiratory: Negative for cough, shortness of breath and wheezing.    Cardiovascular: Negative for chest pain, palpitations and leg swelling.   Gastrointestinal: Negative for diarrhea, nausea and vomiting.   Endocrine: Negative for cold intolerance and heat intolerance.   Genitourinary: Negative for decreased urine volume and dysuria.   Musculoskeletal: Negative for arthralgias and myalgias.   Skin: Negative for rash and wound.   Neurological: Negative for dizziness, light-headedness and headaches.   Hematological: Does not bruise/bleed easily.   Psychiatric/Behavioral: Negative for confusion, dysphoric mood and sleep disturbance. The patient is not nervous/anxious.          Objective  Vitals:    12/01/22 1515 12/01/22 1534   BP: 118/90 112/78   BP Location: Left arm    Patient Position: Sitting    Cuff Size: Large Adult    Pulse: 70    Resp: 18    Temp: 96.9 °F (36.1 °C)    TempSrc: Temporal    SpO2: 99%    Weight: 96.7 kg (213 lb 3.2 oz)    Height: 162.6 cm (64.02\")      Body mass index is 36.58 kg/m².     Physical Exam  Physical Exam  Vitals and nursing note reviewed.   Constitutional:       General: She is not in acute distress.     Appearance: She is not ill-appearing.   HENT:      Head: Normocephalic.      Right Ear: Tympanic membrane, ear canal and external ear normal. There is no impacted cerumen.      Left Ear: Tympanic membrane, ear canal and " external ear normal. There is no impacted cerumen.      Nose: No congestion or rhinorrhea.      Mouth/Throat:      Mouth: Mucous membranes are moist.      Pharynx: Oropharynx is clear. No oropharyngeal exudate or posterior oropharyngeal erythema.   Eyes:      General:         Right eye: No discharge.         Left eye: No discharge.      Extraocular Movements: Extraocular movements intact.      Conjunctiva/sclera: Conjunctivae normal.      Pupils: Pupils are equal, round, and reactive to light.   Cardiovascular:      Rate and Rhythm: Normal rate. Rhythm irregular.      Heart sounds: Normal heart sounds. No murmur heard.    No friction rub. No gallop.   Pulmonary:      Effort: Pulmonary effort is normal. No respiratory distress.      Breath sounds: Normal breath sounds. No wheezing.   Abdominal:      General: Bowel sounds are normal. There is no distension.      Palpations: Abdomen is soft. There is no mass.      Tenderness: There is no abdominal tenderness.   Musculoskeletal:         General: No swelling. Normal range of motion.      Cervical back: Normal range of motion. No tenderness.      Right lower leg: No edema.      Left lower leg: No edema.   Lymphadenopathy:      Cervical: No cervical adenopathy.   Skin:     Findings: No bruising, erythema or rash.   Neurological:      Mental Status: She is oriented to person, place, and time.      Gait: Gait normal.   Psychiatric:         Mood and Affect: Mood normal.         Behavior: Behavior normal.         Thought Content: Thought content normal.         Judgment: Judgment normal.         Diagnostic Data    ECG 12 Lead    Date/Time: 12/1/2022 4:17 PM  Performed by: Isaias Bourne PA-C  Authorized by: Isaias Bourne PA-C   Rhythm: sinus rhythm  Ectopy: infrequent PVCs  Rate: normal  QRS axis: normal    Clinical impression: non-specific ECG            Assessment  Diagnoses and all orders for this visit:    1. Type 2 diabetes mellitus with hyperglycemia,  with long-term current use of insulin (HCC) (Primary)  -     Cancel: Hemoglobin A1c; Future    2. Mixed anxiety and depressive disorder    3. Hypothyroidism, unspecified type  -     TSH; Future    4. Gastroesophageal reflux disease, unspecified whether esophagitis present    5. Health care maintenance  -     CBC w AUTO Differential; Future  -     Comprehensive Metabolic Panel  -     TSH; Future  -     Cancel: Hemoglobin A1c; Future  -     Lipid Panel    6. Frequent PVCs  -     Adult Transthoracic Echo Complete W/ Cont if Necessary Per Protocol; Future  -     Holter Monitor - 72 Hour Up To 15 Days; Future        Plan    1. Type 2 diabetes mellitus with hyperglycemia, with long-term current use of insulin (HCC) (Primary)- much better, A1c down to 7.8%.  Continue diet and exercise as tolerated.  Refilled pen needles.  Increase metformin to 850 mg 3 times daily.    2. Mixed anxiety and depressive disorder- well controlled on Wellbutrin 300 mg extended release daily.  Keep same.  Continue to monitor.    3. Hypothyroidism, unspecified type- on Synthroid 250 mcg daily, repeat TSH.    4. Gastroesophageal reflux disease, unspecified whether esophagitis present- well controlled on Prilosec 20 mg daily.  Keep same.  Continue to monitor.    5. Health care maintenance- obtain fasting labs.    6. Frequent PVCs- EKG in office revealed PVCs without any ST changes.  Obtain echo and Holter.  Advised for any cardiac symptoms to call 911 immediately. Patient verbalized understanding of all instructions given and complied.      Return in about 4 weeks (around 12/29/2022) for Recheck.    Isaias Bourne PA-C  12/01/2022  Answers for HPI/ROS submitted by the patient on 12/1/2022  What is the primary reason for your visit?: Diabetes

## 2022-12-07 RX ORDER — INSULIN LISPRO 100 [IU]/ML
INJECTION, SUSPENSION SUBCUTANEOUS
Qty: 15 ML | Refills: 3 | Status: SHIPPED | OUTPATIENT
Start: 2022-12-07 | End: 2022-12-07 | Stop reason: SDUPTHER

## 2022-12-07 RX ORDER — INSULIN LISPRO PROTAMIN/LISPRO 75-25/ML
VIAL (ML) SUBCUTANEOUS
OUTPATIENT
Start: 2022-12-07

## 2022-12-07 RX ORDER — INSULIN LISPRO 100 [IU]/ML
INJECTION, SUSPENSION SUBCUTANEOUS
Qty: 15 ML | Refills: 3 | Status: SHIPPED | OUTPATIENT
Start: 2022-12-07 | End: 2022-12-08 | Stop reason: SDUPTHER

## 2022-12-08 RX ORDER — INSULIN LISPRO 100 [IU]/ML
INJECTION, SUSPENSION SUBCUTANEOUS
Qty: 15 ML | Refills: 3 | Status: SHIPPED | OUTPATIENT
Start: 2022-12-08 | End: 2023-04-05

## 2022-12-12 ENCOUNTER — TELEPHONE (OUTPATIENT)
Dept: INTERNAL MEDICINE | Facility: CLINIC | Age: 57
End: 2022-12-12

## 2022-12-12 NOTE — TELEPHONE ENCOUNTER
Provider: CHRISTIAN BRITT     Caller: CASTRO WISE     Phone Number: [phone]    Reason for Call: PATIENT CAME IN FOR AN APPOINTMENT AND CHRISTIAN BRITT WANTED HER TO GET A HOLTER MONITOR AND SHE HAS NOT HEARD ANYTHING ABOUT THIS AND IS WANTING A CALL BACK TO DISCUSS.

## 2022-12-13 DIAGNOSIS — I49.3 FREQUENT PVCS: Primary | ICD-10-CM

## 2022-12-13 NOTE — TELEPHONE ENCOUNTER
Spoke with patient to let her know that she will need to see cardiology first before she can get her holter monitor test. Please place a referral for Cardiology. Thank you.

## 2022-12-15 ENCOUNTER — LAB (OUTPATIENT)
Dept: LAB | Facility: HOSPITAL | Age: 57
End: 2022-12-15

## 2022-12-15 DIAGNOSIS — E03.9 HYPOTHYROIDISM, UNSPECIFIED TYPE: ICD-10-CM

## 2022-12-15 DIAGNOSIS — Z00.00 HEALTH CARE MAINTENANCE: ICD-10-CM

## 2022-12-15 LAB
ALBUMIN SERPL-MCNC: 4.3 G/DL (ref 3.5–5.2)
ALBUMIN/GLOB SERPL: 1.3 G/DL
ALP SERPL-CCNC: 68 U/L (ref 39–117)
ALT SERPL W P-5'-P-CCNC: 45 U/L (ref 1–33)
ANION GAP SERPL CALCULATED.3IONS-SCNC: 10.3 MMOL/L (ref 5–15)
AST SERPL-CCNC: 34 U/L (ref 1–32)
BASOPHILS # BLD AUTO: 0.02 10*3/MM3 (ref 0–0.2)
BASOPHILS NFR BLD AUTO: 0.4 % (ref 0–1.5)
BILIRUB SERPL-MCNC: 0.3 MG/DL (ref 0–1.2)
BUN SERPL-MCNC: 15 MG/DL (ref 6–20)
BUN/CREAT SERPL: 23.4 (ref 7–25)
CALCIUM SPEC-SCNC: 9.6 MG/DL (ref 8.6–10.5)
CHLORIDE SERPL-SCNC: 97 MMOL/L (ref 98–107)
CHOLEST SERPL-MCNC: 178 MG/DL (ref 0–200)
CO2 SERPL-SCNC: 28.7 MMOL/L (ref 22–29)
CREAT SERPL-MCNC: 0.64 MG/DL (ref 0.57–1)
DEPRECATED RDW RBC AUTO: 40.6 FL (ref 37–54)
EGFRCR SERPLBLD CKD-EPI 2021: 103.2 ML/MIN/1.73
EOSINOPHIL # BLD AUTO: 0.28 10*3/MM3 (ref 0–0.4)
EOSINOPHIL NFR BLD AUTO: 5.7 % (ref 0.3–6.2)
ERYTHROCYTE [DISTWIDTH] IN BLOOD BY AUTOMATED COUNT: 12.8 % (ref 12.3–15.4)
GLOBULIN UR ELPH-MCNC: 3.4 GM/DL
GLUCOSE SERPL-MCNC: 186 MG/DL (ref 65–99)
HCT VFR BLD AUTO: 40.8 % (ref 34–46.6)
HDLC SERPL-MCNC: 46 MG/DL (ref 40–60)
HGB BLD-MCNC: 13.7 G/DL (ref 12–15.9)
IMM GRANULOCYTES # BLD AUTO: 0.01 10*3/MM3 (ref 0–0.05)
IMM GRANULOCYTES NFR BLD AUTO: 0.2 % (ref 0–0.5)
LDLC SERPL CALC-MCNC: 113 MG/DL (ref 0–100)
LDLC/HDLC SERPL: 2.42 {RATIO}
LYMPHOCYTES # BLD AUTO: 1.02 10*3/MM3 (ref 0.7–3.1)
LYMPHOCYTES NFR BLD AUTO: 20.9 % (ref 19.6–45.3)
MCH RBC QN AUTO: 29.4 PG (ref 26.6–33)
MCHC RBC AUTO-ENTMCNC: 33.6 G/DL (ref 31.5–35.7)
MCV RBC AUTO: 87.6 FL (ref 79–97)
MONOCYTES # BLD AUTO: 0.39 10*3/MM3 (ref 0.1–0.9)
MONOCYTES NFR BLD AUTO: 8 % (ref 5–12)
NEUTROPHILS NFR BLD AUTO: 3.17 10*3/MM3 (ref 1.7–7)
NEUTROPHILS NFR BLD AUTO: 64.8 % (ref 42.7–76)
NRBC BLD AUTO-RTO: 0 /100 WBC (ref 0–0.2)
PLATELET # BLD AUTO: 293 10*3/MM3 (ref 140–450)
PMV BLD AUTO: 10.5 FL (ref 6–12)
POTASSIUM SERPL-SCNC: 4.2 MMOL/L (ref 3.5–5.2)
PROT SERPL-MCNC: 7.7 G/DL (ref 6–8.5)
RBC # BLD AUTO: 4.66 10*6/MM3 (ref 3.77–5.28)
SODIUM SERPL-SCNC: 136 MMOL/L (ref 136–145)
TRIGL SERPL-MCNC: 103 MG/DL (ref 0–150)
TSH SERPL DL<=0.05 MIU/L-ACNC: 3.81 UIU/ML (ref 0.27–4.2)
VLDLC SERPL-MCNC: 19 MG/DL (ref 5–40)
WBC NRBC COR # BLD: 4.89 10*3/MM3 (ref 3.4–10.8)

## 2022-12-15 PROCEDURE — 80061 LIPID PANEL: CPT | Performed by: PHYSICIAN ASSISTANT

## 2022-12-15 PROCEDURE — 80050 GENERAL HEALTH PANEL: CPT

## 2022-12-15 PROCEDURE — 36415 COLL VENOUS BLD VENIPUNCTURE: CPT | Performed by: PHYSICIAN ASSISTANT

## 2022-12-22 ENCOUNTER — TELEPHONE (OUTPATIENT)
Dept: INTERNAL MEDICINE | Facility: CLINIC | Age: 57
End: 2022-12-22

## 2023-01-06 RX ORDER — DULAGLUTIDE 1.5 MG/.5ML
INJECTION, SOLUTION SUBCUTANEOUS
Qty: 4 ML | Refills: 0 | Status: SHIPPED | OUTPATIENT
Start: 2023-01-06 | End: 2023-02-03

## 2023-01-20 ENCOUNTER — HOSPITAL ENCOUNTER (OUTPATIENT)
Dept: CARDIOLOGY | Facility: HOSPITAL | Age: 58
Discharge: HOME | End: 2023-01-20
Payer: COMMERCIAL

## 2023-02-03 RX ORDER — BUPROPION HYDROCHLORIDE 300 MG/1
TABLET ORAL
Qty: 90 TABLET | Refills: 0 | Status: SHIPPED | OUTPATIENT
Start: 2023-02-03

## 2023-02-03 RX ORDER — LEVOTHYROXINE SODIUM 200 UG/1
TABLET ORAL
Qty: 90 TABLET | Refills: 0 | Status: SHIPPED | OUTPATIENT
Start: 2023-02-03

## 2023-02-03 RX ORDER — DULAGLUTIDE 1.5 MG/.5ML
INJECTION, SOLUTION SUBCUTANEOUS
Qty: 4 ML | Refills: 0 | Status: SHIPPED | OUTPATIENT
Start: 2023-02-03 | End: 2023-04-05

## 2023-02-03 NOTE — TELEPHONE ENCOUNTER
Rx Refill Note  Requested Prescriptions     Pending Prescriptions Disp Refills   • Euthyrox 200 MCG tablet [Pharmacy Med Name: Euthyrox 200 MCG Oral Tablet] 90 tablet 0     Sig: TAKE 1 TABLET BY MOUTH ONCE DAILY IN THE MORNING   • buPROPion XL (WELLBUTRIN XL) 300 MG 24 hr tablet [Pharmacy Med Name: buPROPion HCl ER (XL) 300 MG Oral Tablet Extended Release 24 Hour] 90 tablet 0     Sig: TAKE 1 TABLET BY MOUTH ONCE DAILY IN THE MORNING      Last office visit with prescribing clinician: 12/1/2022   Last telemedicine visit with prescribing clinician: 2/3/2023   Next office visit with prescribing clinician: 2/3/2023        12/15/2022                   Donnell Schaffer MA  02/03/23, 11:10 EST

## 2023-02-03 NOTE — TELEPHONE ENCOUNTER
Rx Refill Note  Requested Prescriptions     Pending Prescriptions Disp Refills   • Trulicity 1.5 MG/0.5ML solution pen-injector [Pharmacy Med Name: Trulicity 1.5 MG/0.5ML Subcutaneous Solution Pen-injector] 4 mL 0     Sig: INJECT 1  SUBCUTANEOUSLY ONCE A WEEK      Last office visit with prescribing clinician: 12/1/2022   Last telemedicine visit with prescribing clinician: Visit date not found   Next office visit with prescribing clinician: Visit date not found        12/1/2022                   Donnell Schaffer MA  02/03/23, 11:11 EST

## 2023-02-16 ENCOUNTER — TELEPHONE (OUTPATIENT)
Dept: INTERNAL MEDICINE | Facility: CLINIC | Age: 58
End: 2023-02-16
Payer: COMMERCIAL

## 2023-02-16 ENCOUNTER — HOSPITAL ENCOUNTER (OUTPATIENT)
Dept: CARDIOLOGY | Facility: HOSPITAL | Age: 58
Discharge: HOME | End: 2023-02-16
Admitting: PHYSICIAN ASSISTANT
Payer: COMMERCIAL

## 2023-02-16 VITALS — WEIGHT: 211.64 LBS | HEIGHT: 64 IN | BODY MASS INDEX: 36.13 KG/M2

## 2023-02-16 DIAGNOSIS — I49.3 FREQUENT PVCS: ICD-10-CM

## 2023-02-16 LAB
ASCENDING AORTA: 3.6 CM
BH CV ECHO MEAS - AO MAX PG: 9.5 MMHG
BH CV ECHO MEAS - AO MEAN PG: 5 MMHG
BH CV ECHO MEAS - AO ROOT DIAM: 3.5 CM
BH CV ECHO MEAS - AO V2 MAX: 154 CM/SEC
BH CV ECHO MEAS - AO V2 VTI: 27.8 CM
BH CV ECHO MEAS - AVA(I,D): 1.65 CM2
BH CV ECHO MEAS - EDV(CUBED): 107.2 ML
BH CV ECHO MEAS - EDV(MOD-SP2): 56 ML
BH CV ECHO MEAS - EDV(MOD-SP4): 57 ML
BH CV ECHO MEAS - EF(MOD-BP): 57 %
BH CV ECHO MEAS - EF(MOD-SP2): 55.4 %
BH CV ECHO MEAS - EF(MOD-SP4): 56.1 %
BH CV ECHO MEAS - ESV(CUBED): 28.4 ML
BH CV ECHO MEAS - ESV(MOD-SP2): 25 ML
BH CV ECHO MEAS - ESV(MOD-SP4): 25 ML
BH CV ECHO MEAS - FS: 35.8 %
BH CV ECHO MEAS - IVS/LVPW: 1.15 CM
BH CV ECHO MEAS - IVSD: 1.28 CM
BH CV ECHO MEAS - LA DIMENSION: 3.9 CM
BH CV ECHO MEAS - LAT PEAK E' VEL: 10.6 CM/SEC
BH CV ECHO MEAS - LV MASS(C)D: 214.3 GRAMS
BH CV ECHO MEAS - LV MAX PG: 3.1 MMHG
BH CV ECHO MEAS - LV MEAN PG: 1 MMHG
BH CV ECHO MEAS - LV V1 MAX: 88.7 CM/SEC
BH CV ECHO MEAS - LV V1 VTI: 16.2 CM
BH CV ECHO MEAS - LVIDD: 4.8 CM
BH CV ECHO MEAS - LVIDS: 3.1 CM
BH CV ECHO MEAS - LVOT AREA: 2.8 CM2
BH CV ECHO MEAS - LVOT DIAM: 1.9 CM
BH CV ECHO MEAS - LVPWD: 1.11 CM
BH CV ECHO MEAS - MED PEAK E' VEL: 3.8 CM/SEC
BH CV ECHO MEAS - MV A MAX VEL: 84.9 CM/SEC
BH CV ECHO MEAS - MV DEC SLOPE: 242 CM/SEC2
BH CV ECHO MEAS - MV DEC TIME: 0.3 MSEC
BH CV ECHO MEAS - MV E MAX VEL: 59.2 CM/SEC
BH CV ECHO MEAS - MV E/A: 0.7
BH CV ECHO MEAS - MV P1/2T: 105.2 MSEC
BH CV ECHO MEAS - MVA(P1/2T): 2.09 CM2
BH CV ECHO MEAS - PA ACC SLOPE: 623 CM/SEC2
BH CV ECHO MEAS - PA ACC TIME: 0.13 SEC
BH CV ECHO MEAS - PA PR(ACCEL): 21.9 MMHG
BH CV ECHO MEAS - SV(LVOT): 45.9 ML
BH CV ECHO MEAS - SV(MOD-SP2): 31 ML
BH CV ECHO MEAS - SV(MOD-SP4): 32 ML
BH CV ECHO MEAS - TAPSE (>1.6): 1.9 CM
BH CV ECHO MEASUREMENTS AVERAGE E/E' RATIO: 8.22
BH CV VAS BP RIGHT ARM: NORMAL MMHG
BH CV XLRA - RV BASE: 2.7 CM
BH CV XLRA - RV LENGTH: 4.7 CM
BH CV XLRA - RV MID: 2.2 CM
BH CV XLRA - TDI S': 9.2 CM/SEC
LEFT ATRIUM VOLUME INDEX: 23.4 ML/M2
MAXIMAL PREDICTED HEART RATE: 163 BPM
STRESS TARGET HR: 139 BPM

## 2023-02-16 PROCEDURE — 93306 TTE W/DOPPLER COMPLETE: CPT | Performed by: INTERNAL MEDICINE

## 2023-02-16 PROCEDURE — 93306 TTE W/DOPPLER COMPLETE: CPT

## 2023-04-05 ENCOUNTER — TELEPHONE (OUTPATIENT)
Dept: INTERNAL MEDICINE | Facility: CLINIC | Age: 58
End: 2023-04-05
Payer: COMMERCIAL

## 2023-04-05 RX ORDER — VENLAFAXINE HYDROCHLORIDE 75 MG/1
CAPSULE, EXTENDED RELEASE ORAL
Qty: 270 CAPSULE | Refills: 0 | Status: SHIPPED | OUTPATIENT
Start: 2023-04-05

## 2023-04-05 RX ORDER — DULAGLUTIDE 1.5 MG/.5ML
INJECTION, SOLUTION SUBCUTANEOUS
Qty: 4 ML | Refills: 0 | Status: SHIPPED | OUTPATIENT
Start: 2023-04-05 | End: 2023-04-10

## 2023-04-05 RX ORDER — INSULIN LISPRO 100 [IU]/ML
INJECTION, SUSPENSION SUBCUTANEOUS
Qty: 15 ML | Refills: 0 | Status: SHIPPED | OUTPATIENT
Start: 2023-04-05

## 2023-04-05 NOTE — TELEPHONE ENCOUNTER
Caller: [facility] [address] [place] [address] [place] - [facility] - [phone] [facility] [phone] FX    Relationship: Pharmacy    Best call back number: [phone]    What is the best time to reach you: ANY    Who are you requesting to speak with (clinical staff, provider,  specific staff member): CHRISTIAN BRITT OR HIS NURSE    What was the call regarding: THE DIRECTIONS OF THE Trulicity 1.5 MG/0.5ML solution pen-injector  DO NOT MATCH THE DOSE. IT WILL ONLY INJECT WHAT THE STRENGTH IS. ARE THE DIRECTIONS SUPPOSED TO BE TO INJECT 1.5 MG SUBCUTANEOUSLY ONCE A WEEK INSTEAD OF THE 1MG?    VERBAL CLARIFICATION IS ALL THAT IS NEEDED     Do you require a callback: YES

## 2023-04-05 NOTE — TELEPHONE ENCOUNTER
Rx Refill Note  Requested Prescriptions     Pending Prescriptions Disp Refills   • venlafaxine XR (EFFEXOR-XR) 75 MG 24 hr capsule [Pharmacy Med Name: Venlafaxine HCl ER 75 MG Oral Capsule Extended Release 24 Hour] 270 capsule 0     Sig: TAKE 1 CAPSULE BY MOUTH THREE TIMES DAILY      Last office visit with prescribing clinician: 12/1/2022   Last telemedicine visit with prescribing clinician: Visit date not found   Next office visit with prescribing clinician: Visit date not found                           Donnell Schaffer MA  04/05/23, 08:17 EDT

## 2023-04-10 ENCOUNTER — TELEPHONE (OUTPATIENT)
Dept: INTERNAL MEDICINE | Facility: CLINIC | Age: 58
End: 2023-04-10
Payer: COMMERCIAL

## 2023-04-10 RX ORDER — DULAGLUTIDE 1.5 MG/.5ML
1.5 INJECTION, SOLUTION SUBCUTANEOUS WEEKLY
Qty: 4 ML | Refills: 0 | Status: SHIPPED | OUTPATIENT
Start: 2023-04-10

## 2023-04-10 NOTE — TELEPHONE ENCOUNTER
Pt no longer has insurance and can no longer afford it. Pt is not taking it.               Pt believes she has been on this medication for the past 9-10 months.    She takes 1mg weekly

## 2023-04-10 NOTE — TELEPHONE ENCOUNTER
Received a fax today asking about trulicity. The Drug is 1.5mg/0.5ml weekly but the sig says inject 1mg sc once a week. Can you please rewrite to appropriate dosage.